# Patient Record
Sex: FEMALE | Race: WHITE | URBAN - METROPOLITAN AREA
[De-identification: names, ages, dates, MRNs, and addresses within clinical notes are randomized per-mention and may not be internally consistent; named-entity substitution may affect disease eponyms.]

---

## 2017-06-12 ENCOUNTER — OUTPATIENT (OUTPATIENT)
Dept: OUTPATIENT SERVICES | Facility: HOSPITAL | Age: 42
LOS: 1 days | Discharge: HOME | End: 2017-06-12

## 2017-06-26 ENCOUNTER — OUTPATIENT (OUTPATIENT)
Dept: OUTPATIENT SERVICES | Facility: HOSPITAL | Age: 42
LOS: 1 days | Discharge: HOME | End: 2017-06-26

## 2017-06-28 DIAGNOSIS — Z01.419 ENCOUNTER FOR GYNECOLOGICAL EXAMINATION (GENERAL) (ROUTINE) WITHOUT ABNORMAL FINDINGS: ICD-10-CM

## 2017-06-28 DIAGNOSIS — Z11.3 ENCOUNTER FOR SCREENING FOR INFECTIONS WITH A PREDOMINANTLY SEXUAL MODE OF TRANSMISSION: ICD-10-CM

## 2017-06-28 DIAGNOSIS — Z12.31 ENCOUNTER FOR SCREENING MAMMOGRAM FOR MALIGNANT NEOPLASM OF BREAST: ICD-10-CM

## 2018-07-13 ENCOUNTER — OUTPATIENT (OUTPATIENT)
Dept: OUTPATIENT SERVICES | Facility: HOSPITAL | Age: 43
LOS: 1 days | Discharge: HOME | End: 2018-07-13

## 2018-07-13 DIAGNOSIS — Z12.31 ENCOUNTER FOR SCREENING MAMMOGRAM FOR MALIGNANT NEOPLASM OF BREAST: ICD-10-CM

## 2018-07-13 PROBLEM — Z00.00 ENCOUNTER FOR PREVENTIVE HEALTH EXAMINATION: Status: ACTIVE | Noted: 2018-07-13

## 2018-11-27 ENCOUNTER — EMERGENCY (EMERGENCY)
Facility: HOSPITAL | Age: 43
LOS: 0 days | Discharge: HOME | End: 2018-11-27
Admitting: PHYSICIAN ASSISTANT

## 2018-11-27 VITALS
SYSTOLIC BLOOD PRESSURE: 128 MMHG | RESPIRATION RATE: 20 BRPM | OXYGEN SATURATION: 99 % | HEART RATE: 68 BPM | TEMPERATURE: 96 F | DIASTOLIC BLOOD PRESSURE: 66 MMHG
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
## 2018-11-27 DIAGNOSIS — S99.919A UNSPECIFIED INJURY OF UNSPECIFIED ANKLE, INITIAL ENCOUNTER: ICD-10-CM
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
## 2018-11-27 DIAGNOSIS — S93.402A SPRAIN OF UNSPECIFIED LIGAMENT OF LEFT ANKLE, INITIAL ENCOUNTER: ICD-10-CM

## 2018-11-27 NOTE — ED ADULT NURSE NOTE - OBJECTIVE STATEMENT
patient fell while running in heels to catch the bus for work, left leg painful swollen and tender to touch.

## 2018-11-27 NOTE — ED PROVIDER NOTE - PHYSICAL EXAMINATION
CONST: Well appearing in NAD  MS: Normal ROM in all extremities. No midline spinal tenderness. Left ankle lateral tenderness and swelling. FROM. NV intact  SKIN: Warm, dry, no acute rashes. Good turgor  NEURO: A&Ox3, No focal deficits. Strength 5/5 with no sensory deficits.

## 2018-11-27 NOTE — ED PROVIDER NOTE - CARE PROVIDER_API CALL
Wil Carson), Orthopaedic Surgery  [address] [place] [id number]  Phone: [phone]  Fax: [phone]

## 2018-11-27 NOTE — ED ADULT TRIAGE NOTE - CHIEF COMPLAINT QUOTE
pt was running for bus and twisted left ankle, denies hitting head or LOC. alert and in no distress. no anticoagulants.

## 2018-11-27 NOTE — ED PROVIDER NOTE - NS ED ROS FT
CONST: No fever, chills or bodyaches  EYES: No pain, redness, drainage or visual changes.  ENT: No ear pain or discharge, nasal discharge or congestion. No sore throat  CARD: No chest pain, palpitations  RESP: No SOB, cough, hemoptysis. No hx of asthma or COPD  SKIN: No rashes  NEURO: No headache, dizziness, paresthesias or LOC

## 2019-05-23 ENCOUNTER — OUTPATIENT (OUTPATIENT)
Dept: OUTPATIENT SERVICES | Facility: HOSPITAL | Age: 44
LOS: 1 days | Discharge: HOME | End: 2019-05-23

## 2019-05-23 DIAGNOSIS — Z00.00 ENCOUNTER FOR GENERAL ADULT MEDICAL EXAMINATION WITHOUT ABNORMAL FINDINGS: ICD-10-CM

## 2019-05-28 ENCOUNTER — OUTPATIENT (OUTPATIENT)
Dept: OUTPATIENT SERVICES | Facility: HOSPITAL | Age: 44
LOS: 1 days | Discharge: HOME | End: 2019-05-28
Payer: COMMERCIAL

## 2019-05-28 VITALS
SYSTOLIC BLOOD PRESSURE: 118 MMHG | HEART RATE: 77 BPM | DIASTOLIC BLOOD PRESSURE: 78 MMHG | RESPIRATION RATE: 20 BRPM | TEMPERATURE: 98 F | OXYGEN SATURATION: 100 % | WEIGHT: 149.69 LBS | HEIGHT: 62 IN

## 2019-05-28 DIAGNOSIS — Z01.818 ENCOUNTER FOR OTHER PREPROCEDURAL EXAMINATION: ICD-10-CM

## 2019-05-28 DIAGNOSIS — N92.0 EXCESSIVE AND FREQUENT MENSTRUATION WITH REGULAR CYCLE: ICD-10-CM

## 2019-05-28 LAB
ALBUMIN SERPL ELPH-MCNC: 4.4 G/DL — SIGNIFICANT CHANGE UP (ref 3.5–5.2)
ALP SERPL-CCNC: 54 U/L — SIGNIFICANT CHANGE UP (ref 30–115)
ALT FLD-CCNC: 12 U/L — SIGNIFICANT CHANGE UP (ref 0–41)
ANION GAP SERPL CALC-SCNC: 17 MMOL/L — HIGH (ref 7–14)
APPEARANCE UR: ABNORMAL
APTT BLD: 31.3 SEC — SIGNIFICANT CHANGE UP (ref 27–39.2)
AST SERPL-CCNC: 12 U/L — SIGNIFICANT CHANGE UP (ref 0–41)
BACTERIA # UR AUTO: ABNORMAL /HPF
BASOPHILS # BLD AUTO: 0.04 K/UL — SIGNIFICANT CHANGE UP (ref 0–0.2)
BASOPHILS NFR BLD AUTO: 0.5 % — SIGNIFICANT CHANGE UP (ref 0–1)
BILIRUB SERPL-MCNC: 0.2 MG/DL — SIGNIFICANT CHANGE UP (ref 0.2–1.2)
BILIRUB UR-MCNC: NEGATIVE — SIGNIFICANT CHANGE UP
BUN SERPL-MCNC: 25 MG/DL — HIGH (ref 10–20)
CALCIUM SERPL-MCNC: 9.4 MG/DL — SIGNIFICANT CHANGE UP (ref 8.5–10.1)
CHLORIDE SERPL-SCNC: 107 MMOL/L — SIGNIFICANT CHANGE UP (ref 98–110)
CO2 SERPL-SCNC: 15 MMOL/L — LOW (ref 17–32)
COD CRY URNS QL: ABNORMAL /HPF
COLOR SPEC: SIGNIFICANT CHANGE UP
CREAT SERPL-MCNC: 1.2 MG/DL — SIGNIFICANT CHANGE UP (ref 0.7–1.5)
DIFF PNL FLD: ABNORMAL
EOSINOPHIL # BLD AUTO: 0.13 K/UL — SIGNIFICANT CHANGE UP (ref 0–0.7)
EOSINOPHIL NFR BLD AUTO: 1.7 % — SIGNIFICANT CHANGE UP (ref 0–8)
EPI CELLS # UR: ABNORMAL /HPF
GLUCOSE SERPL-MCNC: 91 MG/DL — SIGNIFICANT CHANGE UP (ref 70–99)
GLUCOSE UR QL: NEGATIVE — SIGNIFICANT CHANGE UP
HCT VFR BLD CALC: 44.8 % — SIGNIFICANT CHANGE UP (ref 37–47)
HGB BLD-MCNC: 15.2 G/DL — SIGNIFICANT CHANGE UP (ref 12–16)
IMM GRANULOCYTES NFR BLD AUTO: 0.9 % — HIGH (ref 0.1–0.3)
INR BLD: 0.92 RATIO — SIGNIFICANT CHANGE UP (ref 0.65–1.3)
KETONES UR-MCNC: NEGATIVE — SIGNIFICANT CHANGE UP
LEUKOCYTE ESTERASE UR-ACNC: NEGATIVE — SIGNIFICANT CHANGE UP
LYMPHOCYTES # BLD AUTO: 2.06 K/UL — SIGNIFICANT CHANGE UP (ref 1.2–3.4)
LYMPHOCYTES # BLD AUTO: 26.3 % — SIGNIFICANT CHANGE UP (ref 20.5–51.1)
MCHC RBC-ENTMCNC: 30.4 PG — SIGNIFICANT CHANGE UP (ref 27–31)
MCHC RBC-ENTMCNC: 33.9 G/DL — SIGNIFICANT CHANGE UP (ref 32–37)
MCV RBC AUTO: 89.6 FL — SIGNIFICANT CHANGE UP (ref 81–99)
MONOCYTES # BLD AUTO: 0.52 K/UL — SIGNIFICANT CHANGE UP (ref 0.1–0.6)
MONOCYTES NFR BLD AUTO: 6.6 % — SIGNIFICANT CHANGE UP (ref 1.7–9.3)
NEUTROPHILS # BLD AUTO: 5 K/UL — SIGNIFICANT CHANGE UP (ref 1.4–6.5)
NEUTROPHILS NFR BLD AUTO: 64 % — SIGNIFICANT CHANGE UP (ref 42.2–75.2)
NITRITE UR-MCNC: NEGATIVE — SIGNIFICANT CHANGE UP
NRBC # BLD: 0 /100 WBCS — SIGNIFICANT CHANGE UP (ref 0–0)
PH UR: 6 — SIGNIFICANT CHANGE UP (ref 5–8)
PLATELET # BLD AUTO: 273 K/UL — SIGNIFICANT CHANGE UP (ref 130–400)
POTASSIUM SERPL-MCNC: 3.9 MMOL/L — SIGNIFICANT CHANGE UP (ref 3.5–5)
POTASSIUM SERPL-SCNC: 3.9 MMOL/L — SIGNIFICANT CHANGE UP (ref 3.5–5)
PROT SERPL-MCNC: 6.6 G/DL — SIGNIFICANT CHANGE UP (ref 6–8)
PROT UR-MCNC: 30
PROTHROM AB SERPL-ACNC: 10.6 SEC — SIGNIFICANT CHANGE UP (ref 9.95–12.87)
RBC # BLD: 5 M/UL — SIGNIFICANT CHANGE UP (ref 4.2–5.4)
RBC # FLD: 12.8 % — SIGNIFICANT CHANGE UP (ref 11.5–14.5)
SODIUM SERPL-SCNC: 139 MMOL/L — SIGNIFICANT CHANGE UP (ref 135–146)
SP GR SPEC: >=1.03 — SIGNIFICANT CHANGE UP (ref 1.01–1.03)
UROBILINOGEN FLD QL: 0.2 — SIGNIFICANT CHANGE UP (ref 0.2–0.2)
WBC # BLD: 7.82 K/UL — SIGNIFICANT CHANGE UP (ref 4.8–10.8)
WBC # FLD AUTO: 7.82 K/UL — SIGNIFICANT CHANGE UP (ref 4.8–10.8)
WBC UR QL: SIGNIFICANT CHANGE UP /HPF

## 2019-05-28 PROCEDURE — 93010 ELECTROCARDIOGRAM REPORT: CPT

## 2019-05-28 PROCEDURE — 71046 X-RAY EXAM CHEST 2 VIEWS: CPT | Mod: 26

## 2019-05-28 NOTE — H&P PST ADULT - HISTORY OF PRESENT ILLNESS
43 y/o female scheduled for removal of iud and d&c  reports no c/o cp,sob,palpitations,cough or dysuria  1-2 fos without sob

## 2019-05-31 ENCOUNTER — OUTPATIENT (OUTPATIENT)
Dept: OUTPATIENT SERVICES | Facility: HOSPITAL | Age: 44
LOS: 1 days | Discharge: HOME | End: 2019-05-31
Payer: COMMERCIAL

## 2019-05-31 ENCOUNTER — RESULT REVIEW (OUTPATIENT)
Age: 44
End: 2019-05-31

## 2019-05-31 VITALS
DIASTOLIC BLOOD PRESSURE: 57 MMHG | OXYGEN SATURATION: 99 % | RESPIRATION RATE: 18 BRPM | TEMPERATURE: 98 F | HEART RATE: 68 BPM | HEIGHT: 62 IN | SYSTOLIC BLOOD PRESSURE: 120 MMHG | WEIGHT: 149.69 LBS

## 2019-05-31 VITALS
DIASTOLIC BLOOD PRESSURE: 75 MMHG | SYSTOLIC BLOOD PRESSURE: 123 MMHG | RESPIRATION RATE: 18 BRPM | HEART RATE: 71 BPM | OXYGEN SATURATION: 100 %

## 2019-05-31 DIAGNOSIS — Z33.2 ENCOUNTER FOR ELECTIVE TERMINATION OF PREGNANCY: Chronic | ICD-10-CM

## 2019-05-31 PROCEDURE — 88300 SURGICAL PATH GROSS: CPT | Mod: 26,59

## 2019-05-31 PROCEDURE — 88305 TISSUE EXAM BY PATHOLOGIST: CPT | Mod: 26

## 2019-05-31 RX ORDER — SODIUM CHLORIDE 9 MG/ML
1000 INJECTION, SOLUTION INTRAVENOUS
Refills: 0 | Status: DISCONTINUED | OUTPATIENT
Start: 2019-05-31 | End: 2019-06-15

## 2019-05-31 RX ORDER — HYDROMORPHONE HYDROCHLORIDE 2 MG/ML
0.5 INJECTION INTRAMUSCULAR; INTRAVENOUS; SUBCUTANEOUS
Refills: 0 | Status: DISCONTINUED | OUTPATIENT
Start: 2019-05-31 | End: 2019-05-31

## 2019-05-31 RX ORDER — ONDANSETRON 8 MG/1
4 TABLET, FILM COATED ORAL ONCE
Refills: 0 | Status: DISCONTINUED | OUTPATIENT
Start: 2019-05-31 | End: 2019-06-15

## 2019-05-31 RX ORDER — OXYCODONE AND ACETAMINOPHEN 5; 325 MG/1; MG/1
1 TABLET ORAL ONCE
Refills: 0 | Status: DISCONTINUED | OUTPATIENT
Start: 2019-05-31 | End: 2019-05-31

## 2019-05-31 RX ADMIN — SODIUM CHLORIDE 100 MILLILITER(S): 9 INJECTION, SOLUTION INTRAVENOUS at 09:10

## 2019-05-31 NOTE — BRIEF OPERATIVE NOTE - OPERATION/FINDINGS
EUA: 8wk size, anteverted uterus  hysteroscopy: significant amount of endometrial tissue/blood present in cavity, bilateral ostia visualized

## 2019-05-31 NOTE — BRIEF OPERATIVE NOTE - NSICDXBRIEFPREOP_GEN_ALL_CORE_FT
PRE-OP DIAGNOSIS:  Menorrhagia 31-May-2019 09:03:40  Nandini Oseguera  Encounter for IUD removal 31-May-2019 09:03:30  Nandini Oseguera

## 2019-05-31 NOTE — BRIEF OPERATIVE NOTE - NSICDXBRIEFPROCEDURE_GEN_ALL_CORE_FT
PROCEDURES:  Removal of IUD 31-May-2019 09:01:40  Nandini Oseguera  D&C (dilatation and curettage, scraping of uterus) 31-May-2019 09:01:27  Nandini Oseguera  Diagnostic hysteroscopy 31-May-2019 09:01:20  Nandini Oseguera

## 2019-05-31 NOTE — BRIEF OPERATIVE NOTE - NSICDXBRIEFPOSTOP_GEN_ALL_CORE_FT
POST-OP DIAGNOSIS:  Menorrhagia 31-May-2019 09:04:03  Nandini Oseguera  Encounter for IUD removal 31-May-2019 09:03:54  Nandini Oseguera POST-OP DIAGNOSIS:  Menorrhagia 31-May-2019 09:04:03  Nandini Oseguera  Encounter for IUD removal 31-May-2019 09:03:54  Nandnii Oseguera

## 2019-05-31 NOTE — ASU DISCHARGE PLAN (ADULT/PEDIATRIC) - CARE PROVIDER_API CALL
Miguel Arango)  Obstetrics and Gynecology  [address] [place] [address]  Phone: [phone]  Fax: [phone]  Follow Up Time:

## 2019-05-31 NOTE — CHART NOTE - NSCHARTNOTEFT_GEN_A_CORE
PACU ANESTHESIA ADMISSION NOTE      Procedure: Removal of IUD  D&C (dilatation and curettage, scraping of uterus)  Diagnostic hysteroscopy    Post op diagnosis:      ____  Intubated  TV:______       Rate: ______      FiO2: ______    __x__  Patent Airway    __x__  Full return of protective reflexes    __x__  Full recovery from anesthesia / back to baseline status      Vitals:   BP   112/67         HR  84         RR   12          O2 sat   98        Temp 97.7      Mental Status:  __x__ Awake   _____ Alert   _____ Drowsy   _____ Sedated    Nausea/Vomiting:  __x__ No    ____ Yes, See Post - Op Orders        Pain Scale (0-10):  __0___    Treatment: ____ None    ____ See Post - Op/PCA Orders    Post - Operative Fluids:   __x__ Oral   ____ See Post - Op Orders    Plan: Discharge:   _x___Home       _____Floor     _____Critical Care    _____  Other:_________________    Comments: No anesthesia complications noted.  Discharge once criteria met.

## 2019-05-31 NOTE — PRE-ANESTHESIA EVALUATION ADULT - NSANTHOSAYNRD_GEN_A_CORE
No. MIKALA screening performed.  STOP BANG Legend: 0-2 = LOW Risk; 3-4 = INTERMEDIATE Risk; 5-8 = HIGH Risk/see mikala tool

## 2019-06-04 LAB — SURGICAL PATHOLOGY STUDY: SIGNIFICANT CHANGE UP

## 2019-06-06 DIAGNOSIS — Z30.432 ENCOUNTER FOR REMOVAL OF INTRAUTERINE CONTRACEPTIVE DEVICE: ICD-10-CM

## 2019-06-06 DIAGNOSIS — Z87.891 PERSONAL HISTORY OF NICOTINE DEPENDENCE: ICD-10-CM

## 2019-06-06 DIAGNOSIS — N92.0 EXCESSIVE AND FREQUENT MENSTRUATION WITH REGULAR CYCLE: ICD-10-CM

## 2019-08-12 ENCOUNTER — OUTPATIENT (OUTPATIENT)
Dept: OUTPATIENT SERVICES | Facility: HOSPITAL | Age: 44
LOS: 1 days | Discharge: HOME | End: 2019-08-12
Payer: COMMERCIAL

## 2019-08-12 VITALS
SYSTOLIC BLOOD PRESSURE: 110 MMHG | TEMPERATURE: 98 F | WEIGHT: 148.59 LBS | OXYGEN SATURATION: 99 % | DIASTOLIC BLOOD PRESSURE: 72 MMHG | RESPIRATION RATE: 16 BRPM | HEIGHT: 63 IN | HEART RATE: 72 BPM

## 2019-08-12 DIAGNOSIS — Z01.818 ENCOUNTER FOR OTHER PREPROCEDURAL EXAMINATION: ICD-10-CM

## 2019-08-12 DIAGNOSIS — Z98.890 OTHER SPECIFIED POSTPROCEDURAL STATES: Chronic | ICD-10-CM

## 2019-08-12 DIAGNOSIS — Z33.2 ENCOUNTER FOR ELECTIVE TERMINATION OF PREGNANCY: Chronic | ICD-10-CM

## 2019-08-12 DIAGNOSIS — N92.0 EXCESSIVE AND FREQUENT MENSTRUATION WITH REGULAR CYCLE: ICD-10-CM

## 2019-08-12 LAB
ALBUMIN SERPL ELPH-MCNC: 4.5 G/DL — SIGNIFICANT CHANGE UP (ref 3.5–5.2)
ALP SERPL-CCNC: 62 U/L — SIGNIFICANT CHANGE UP (ref 30–115)
ALT FLD-CCNC: 12 U/L — SIGNIFICANT CHANGE UP (ref 0–41)
ANION GAP SERPL CALC-SCNC: 13 MMOL/L — SIGNIFICANT CHANGE UP (ref 7–14)
APPEARANCE UR: CLEAR — SIGNIFICANT CHANGE UP
APTT BLD: 29.3 SEC — SIGNIFICANT CHANGE UP (ref 27–39.2)
AST SERPL-CCNC: 13 U/L — SIGNIFICANT CHANGE UP (ref 0–41)
BACTERIA # UR AUTO: NEGATIVE — SIGNIFICANT CHANGE UP
BASOPHILS # BLD AUTO: 0.07 K/UL — SIGNIFICANT CHANGE UP (ref 0–0.2)
BASOPHILS NFR BLD AUTO: 1 % — SIGNIFICANT CHANGE UP (ref 0–1)
BILIRUB SERPL-MCNC: <0.2 MG/DL — SIGNIFICANT CHANGE UP (ref 0.2–1.2)
BILIRUB UR-MCNC: NEGATIVE — SIGNIFICANT CHANGE UP
BUN SERPL-MCNC: 21 MG/DL — HIGH (ref 10–20)
CALCIUM SERPL-MCNC: 9.6 MG/DL — SIGNIFICANT CHANGE UP (ref 8.5–10.1)
CHLORIDE SERPL-SCNC: 110 MMOL/L — SIGNIFICANT CHANGE UP (ref 98–110)
CO2 SERPL-SCNC: 20 MMOL/L — SIGNIFICANT CHANGE UP (ref 17–32)
COLOR SPEC: YELLOW — SIGNIFICANT CHANGE UP
CREAT SERPL-MCNC: 1 MG/DL — SIGNIFICANT CHANGE UP (ref 0.7–1.5)
DIFF PNL FLD: ABNORMAL
EOSINOPHIL # BLD AUTO: 0.14 K/UL — SIGNIFICANT CHANGE UP (ref 0–0.7)
EOSINOPHIL NFR BLD AUTO: 2 % — SIGNIFICANT CHANGE UP (ref 0–8)
EPI CELLS # UR: 5 /HPF — SIGNIFICANT CHANGE UP (ref 0–5)
GLUCOSE SERPL-MCNC: 110 MG/DL — HIGH (ref 70–99)
GLUCOSE UR QL: NEGATIVE — SIGNIFICANT CHANGE UP
HCG SERPL-ACNC: <0.6 MIU/ML — SIGNIFICANT CHANGE UP
HCT VFR BLD CALC: 36.8 % — LOW (ref 37–47)
HGB BLD-MCNC: 11.6 G/DL — LOW (ref 12–16)
HYALINE CASTS # UR AUTO: 2 /LPF — SIGNIFICANT CHANGE UP (ref 0–7)
IMM GRANULOCYTES NFR BLD AUTO: 1.2 % — HIGH (ref 0.1–0.3)
INR BLD: 0.98 RATIO — SIGNIFICANT CHANGE UP (ref 0.65–1.3)
KETONES UR-MCNC: SIGNIFICANT CHANGE UP
LEUKOCYTE ESTERASE UR-ACNC: NEGATIVE — SIGNIFICANT CHANGE UP
LYMPHOCYTES # BLD AUTO: 2.1 K/UL — SIGNIFICANT CHANGE UP (ref 1.2–3.4)
LYMPHOCYTES # BLD AUTO: 30.6 % — SIGNIFICANT CHANGE UP (ref 20.5–51.1)
MCHC RBC-ENTMCNC: 28 PG — SIGNIFICANT CHANGE UP (ref 27–31)
MCHC RBC-ENTMCNC: 31.5 G/DL — LOW (ref 32–37)
MCV RBC AUTO: 88.9 FL — SIGNIFICANT CHANGE UP (ref 81–99)
MONOCYTES # BLD AUTO: 0.41 K/UL — SIGNIFICANT CHANGE UP (ref 0.1–0.6)
MONOCYTES NFR BLD AUTO: 6 % — SIGNIFICANT CHANGE UP (ref 1.7–9.3)
NEUTROPHILS # BLD AUTO: 4.06 K/UL — SIGNIFICANT CHANGE UP (ref 1.4–6.5)
NEUTROPHILS NFR BLD AUTO: 59.2 % — SIGNIFICANT CHANGE UP (ref 42.2–75.2)
NITRITE UR-MCNC: NEGATIVE — SIGNIFICANT CHANGE UP
NRBC # BLD: 0 /100 WBCS — SIGNIFICANT CHANGE UP (ref 0–0)
PH UR: 6 — SIGNIFICANT CHANGE UP (ref 5–8)
PLATELET # BLD AUTO: 402 K/UL — HIGH (ref 130–400)
POTASSIUM SERPL-MCNC: 4 MMOL/L — SIGNIFICANT CHANGE UP (ref 3.5–5)
POTASSIUM SERPL-SCNC: 4 MMOL/L — SIGNIFICANT CHANGE UP (ref 3.5–5)
PROT SERPL-MCNC: 6.7 G/DL — SIGNIFICANT CHANGE UP (ref 6–8)
PROT UR-MCNC: ABNORMAL
PROTHROM AB SERPL-ACNC: 11.3 SEC — SIGNIFICANT CHANGE UP (ref 9.95–12.87)
RBC # BLD: 4.14 M/UL — LOW (ref 4.2–5.4)
RBC # FLD: 12.5 % — SIGNIFICANT CHANGE UP (ref 11.5–14.5)
RBC CASTS # UR COMP ASSIST: 9 /HPF — HIGH (ref 0–4)
SODIUM SERPL-SCNC: 143 MMOL/L — SIGNIFICANT CHANGE UP (ref 135–146)
SP GR SPEC: 1.03 — HIGH (ref 1.01–1.02)
UROBILINOGEN FLD QL: SIGNIFICANT CHANGE UP
WBC # BLD: 6.86 K/UL — SIGNIFICANT CHANGE UP (ref 4.8–10.8)
WBC # FLD AUTO: 6.86 K/UL — SIGNIFICANT CHANGE UP (ref 4.8–10.8)
WBC UR QL: 2 /HPF — SIGNIFICANT CHANGE UP (ref 0–5)

## 2019-08-12 PROCEDURE — 93010 ELECTROCARDIOGRAM REPORT: CPT

## 2019-08-12 PROCEDURE — 71046 X-RAY EXAM CHEST 2 VIEWS: CPT | Mod: 26

## 2019-08-12 NOTE — H&P PST ADULT - NEUROLOGICAL
HPI     Ms. Karli Hameed was referred by Katherine Linn MD for cataracts.    She reports occasional near blur without glasses, especially when she is   tired. Eyes (OS>OD) feel tired if she reads or watches TV too long. She is   noticing she is needing to use glasses more often, especially when   reading. She reports satisfactory distance vision without correction.    Would patient like a refraction today? yes    (+)drops: Naphcon A qAM OU, Zaditor occasionally (when eyes burn)  (-)flashes  (-)floaters  (-)diplopia    Diabetic: No  No results found for: HGBA1C    OCULAR HISTORY  Last Eye Exam: 02/22/17 with Dr. Martinez  s/p phaco w/IOL (OD 02/12/15, OS 04/09/15)  Ischemic optic neuropathy OS     FAMILY HISTORY  (-)Glaucoma         Last edited by Joie Villegas, OD on 12/19/2018  9:47 AM. (History)            Assessment /Plan     For exam results, see Encounter Report.    Bilateral dry eyes   Explained relationship between dry eyes and epiphora, blurred vision, and burning. Discontinue ocular decongestant and switch to artificial tears TID OU (especially during sustained near tasks).    Regular astigmatism with presbyopia, bilateral   Small change OU. New glasses prescription released, adaptation expected.  New glasses optional.    Note: Error found on SRx after pt left (sphere power written as -1.25 instead of -0.25 OD). Pt informed via [facility] and corrected SRx mailed to her.   Eyeglass Final Rx     Eyeglass Final Rx       Sphere Cylinder Axis Add    Right -0.25 +1.00 180 +2.75    Left -0.50 +1.00 180 +2.75    Expiration Date:  12/20/2019            Optic disc atrophy, left   Longstanding and stable. Cause of reduced best-corrected visual acuity (20/30, stable). Monitor.    Pseudophakia of both eyes   Doing well OU. Monitor.         RTC 1 year                 
detailed exam

## 2019-08-12 NOTE — H&P PST ADULT - REASON FOR ADMISSION
PT PRESENTS TO PAST WITH NO SOB, CP, PALPITATIONS, DYSURIA, UTI OR URI AT PRESENT.   PT ABLE TO WALK UP 2-3 FLIGHTS OF STEPS WITH NO SOB.  AS PER THE PT, THIS IS HIS/HER COMPLETE MEDICAL AND SURGICAL HX, INCLUDING MEDICATIONS PRESCRIBED AND OVER THE COUNTER  PT PRESENTS TO PAST WITH H/O-   INCREASED MENSTRUATION.

## 2019-08-12 NOTE — H&P PST ADULT - RS GEN PE MLT RESP DETAILS PC
airway patent/clear to auscultation bilaterally/breath sounds equal/respirations non-labored/normal/good air movement/no rales/no chest wall tenderness/no intercostal retractions

## 2019-08-16 ENCOUNTER — OUTPATIENT (OUTPATIENT)
Dept: OUTPATIENT SERVICES | Facility: HOSPITAL | Age: 44
LOS: 1 days | Discharge: HOME | End: 2019-08-16

## 2019-08-16 VITALS
OXYGEN SATURATION: 100 % | RESPIRATION RATE: 18 BRPM | HEART RATE: 71 BPM | SYSTOLIC BLOOD PRESSURE: 122 MMHG | TEMPERATURE: 98 F | HEIGHT: 63 IN | DIASTOLIC BLOOD PRESSURE: 76 MMHG | WEIGHT: 148.59 LBS

## 2019-08-16 VITALS
DIASTOLIC BLOOD PRESSURE: 80 MMHG | OXYGEN SATURATION: 100 % | SYSTOLIC BLOOD PRESSURE: 129 MMHG | RESPIRATION RATE: 18 BRPM | HEART RATE: 55 BPM

## 2019-08-16 DIAGNOSIS — Z33.2 ENCOUNTER FOR ELECTIVE TERMINATION OF PREGNANCY: Chronic | ICD-10-CM

## 2019-08-16 DIAGNOSIS — Z98.890 OTHER SPECIFIED POSTPROCEDURAL STATES: Chronic | ICD-10-CM

## 2019-08-16 RX ORDER — SODIUM CHLORIDE 9 MG/ML
1000 INJECTION, SOLUTION INTRAVENOUS
Refills: 0 | Status: DISCONTINUED | OUTPATIENT
Start: 2019-08-16 | End: 2019-08-31

## 2019-08-16 RX ORDER — ONDANSETRON 8 MG/1
4 TABLET, FILM COATED ORAL ONCE
Refills: 0 | Status: DISCONTINUED | OUTPATIENT
Start: 2019-08-16 | End: 2019-08-31

## 2019-08-16 RX ORDER — MORPHINE SULFATE 50 MG/1
2 CAPSULE, EXTENDED RELEASE ORAL
Refills: 0 | Status: DISCONTINUED | OUTPATIENT
Start: 2019-08-16 | End: 2019-08-16

## 2019-08-16 RX ORDER — OXYCODONE HYDROCHLORIDE 5 MG/1
5 TABLET ORAL ONCE
Refills: 0 | Status: DISCONTINUED | OUTPATIENT
Start: 2019-08-16 | End: 2019-08-16

## 2019-08-16 RX ORDER — SODIUM CHLORIDE 9 MG/ML
3 INJECTION INTRAMUSCULAR; INTRAVENOUS; SUBCUTANEOUS EVERY 8 HOURS
Refills: 0 | Status: DISCONTINUED | OUTPATIENT
Start: 2019-08-16 | End: 2019-08-31

## 2019-08-16 RX ADMIN — OXYCODONE HYDROCHLORIDE 5 MILLIGRAM(S): 5 TABLET ORAL at 09:48

## 2019-08-16 RX ADMIN — SODIUM CHLORIDE 100 MILLILITER(S): 9 INJECTION, SOLUTION INTRAVENOUS at 09:39

## 2019-08-16 NOTE — BRIEF OPERATIVE NOTE - OPERATION/FINDINGS
EUA: normal sized, anteverted uterus with no gross masses, normal contour.  Bilateral adnexa normal.  On hysteroscopic examination, normal endometrial tissue, no polyps visualized.

## 2019-08-16 NOTE — ASU DISCHARGE PLAN (ADULT/PEDIATRIC) - ASU DC SPECIAL INSTRUCTIONSFT
Nothing in the vagina for 2 weeks (no sex, no tampons, no douching). Avoid tub baths, you may shower.  If you have a fever of 100.4F or greater, severe vaginal bleeding, or severe abdominal pain, call your Ob/Gyn or come to the emergency department immediately.  Please follow up with your provider in 2 weeks for postop visit.

## 2019-08-16 NOTE — BRIEF OPERATIVE NOTE - NSICDXBRIEFPROCEDURE_GEN_ALL_CORE_FT
PROCEDURES:  Hysteroscopy with hydrothermal ablation of endometrium 16-Aug-2019 09:12:30  Abigail French

## 2019-08-16 NOTE — ASU DISCHARGE PLAN (ADULT/PEDIATRIC) - CARE PROVIDER_API CALL
Miguel Arango)  Obstetrics and Gynecology  [address] [place] [address]  Phone: [phone]  Fax: [phone]  Follow Up Time:

## 2019-08-16 NOTE — CHART NOTE - NSCHARTNOTEFT_GEN_A_CORE
PACU ANESTHESIA ADMISSION NOTE      Procedure: hydrothrothermablation       ____  Intubated  TV:______       Rate: ______      FiO2: ______    _x___  Patent Airway    _x___  Full return of protective reflexes    _x___  Full recovery from anesthesia / back to baseline status    Vitals:  T(C): 36.4  HR: 71  BP: 122/76  RR: 18  SpO2: 100%    Mental Status:  _x___ Awake   _____ Alert   _____ Drowsy   _____ Sedated    Nausea/Vomiting:  _x___  NO       ______Yes,   See Post - Op Orders         Pain Scale (0-10):  __0___    Treatment: _x___ None    ____ See Post - Op/PCA Orders    Post - Operative Fluids:   __x__ Oral   ____ See Post - Op Orders    Plan: Discharge:   _x___Home       _____Floor     _____Critical Care    _____  Other:_________________    Comments:  No anesthesia issues or complications noted.  Discharge when criteria met.

## 2019-08-19 DIAGNOSIS — N92.0 EXCESSIVE AND FREQUENT MENSTRUATION WITH REGULAR CYCLE: ICD-10-CM

## 2019-09-02 ENCOUNTER — TRANSCRIPTION ENCOUNTER (OUTPATIENT)
Age: 44
End: 2019-09-02

## 2019-11-11 ENCOUNTER — OUTPATIENT (OUTPATIENT)
Dept: OUTPATIENT SERVICES | Facility: HOSPITAL | Age: 44
LOS: 1 days | Discharge: HOME | End: 2019-11-11
Payer: COMMERCIAL

## 2019-11-11 DIAGNOSIS — Z98.890 OTHER SPECIFIED POSTPROCEDURAL STATES: Chronic | ICD-10-CM

## 2019-11-11 DIAGNOSIS — Z33.2 ENCOUNTER FOR ELECTIVE TERMINATION OF PREGNANCY: Chronic | ICD-10-CM

## 2019-11-11 DIAGNOSIS — Z12.31 ENCOUNTER FOR SCREENING MAMMOGRAM FOR MALIGNANT NEOPLASM OF BREAST: ICD-10-CM

## 2019-11-11 PROBLEM — D64.9 ANEMIA, UNSPECIFIED: Chronic | Status: ACTIVE | Noted: 2019-08-12

## 2019-11-11 PROBLEM — L40.9 PSORIASIS, UNSPECIFIED: Chronic | Status: ACTIVE | Noted: 2019-08-16

## 2019-11-11 PROCEDURE — 77067 SCR MAMMO BI INCL CAD: CPT | Mod: 26

## 2019-11-11 PROCEDURE — 77063 BREAST TOMOSYNTHESIS BI: CPT | Mod: 26

## 2019-11-22 ENCOUNTER — OUTPATIENT (OUTPATIENT)
Dept: OUTPATIENT SERVICES | Facility: HOSPITAL | Age: 44
LOS: 1 days | Discharge: HOME | End: 2019-11-22
Payer: COMMERCIAL

## 2019-11-22 VITALS
SYSTOLIC BLOOD PRESSURE: 117 MMHG | DIASTOLIC BLOOD PRESSURE: 67 MMHG | TEMPERATURE: 98 F | HEART RATE: 64 BPM | OXYGEN SATURATION: 100 % | RESPIRATION RATE: 20 BRPM | HEIGHT: 63 IN | WEIGHT: 157.19 LBS

## 2019-11-22 DIAGNOSIS — Z33.2 ENCOUNTER FOR ELECTIVE TERMINATION OF PREGNANCY: Chronic | ICD-10-CM

## 2019-11-22 DIAGNOSIS — Z01.818 ENCOUNTER FOR OTHER PREPROCEDURAL EXAMINATION: ICD-10-CM

## 2019-11-22 DIAGNOSIS — N92.0 EXCESSIVE AND FREQUENT MENSTRUATION WITH REGULAR CYCLE: ICD-10-CM

## 2019-11-22 DIAGNOSIS — Z98.890 OTHER SPECIFIED POSTPROCEDURAL STATES: Chronic | ICD-10-CM

## 2019-11-22 LAB
ALBUMIN SERPL ELPH-MCNC: 4.2 G/DL — SIGNIFICANT CHANGE UP (ref 3.5–5.2)
ALP SERPL-CCNC: 56 U/L — SIGNIFICANT CHANGE UP (ref 30–115)
ALT FLD-CCNC: 9 U/L — SIGNIFICANT CHANGE UP (ref 0–41)
ANION GAP SERPL CALC-SCNC: 13 MMOL/L — SIGNIFICANT CHANGE UP (ref 7–14)
APPEARANCE UR: CLEAR — SIGNIFICANT CHANGE UP
APTT BLD: 30 SEC — SIGNIFICANT CHANGE UP (ref 27–39.2)
AST SERPL-CCNC: 11 U/L — SIGNIFICANT CHANGE UP (ref 0–41)
BASOPHILS # BLD AUTO: 0.06 K/UL — SIGNIFICANT CHANGE UP (ref 0–0.2)
BASOPHILS NFR BLD AUTO: 0.8 % — SIGNIFICANT CHANGE UP (ref 0–1)
BILIRUB SERPL-MCNC: <0.2 MG/DL — SIGNIFICANT CHANGE UP (ref 0.2–1.2)
BILIRUB UR-MCNC: NEGATIVE — SIGNIFICANT CHANGE UP
BUN SERPL-MCNC: 22 MG/DL — HIGH (ref 10–20)
CALCIUM SERPL-MCNC: 9 MG/DL — SIGNIFICANT CHANGE UP (ref 8.5–10.1)
CHLORIDE SERPL-SCNC: 106 MMOL/L — SIGNIFICANT CHANGE UP (ref 98–110)
CO2 SERPL-SCNC: 24 MMOL/L — SIGNIFICANT CHANGE UP (ref 17–32)
COLOR SPEC: YELLOW — SIGNIFICANT CHANGE UP
CREAT SERPL-MCNC: 0.9 MG/DL — SIGNIFICANT CHANGE UP (ref 0.7–1.5)
DIFF PNL FLD: NEGATIVE — SIGNIFICANT CHANGE UP
EOSINOPHIL # BLD AUTO: 0.15 K/UL — SIGNIFICANT CHANGE UP (ref 0–0.7)
EOSINOPHIL NFR BLD AUTO: 2 % — SIGNIFICANT CHANGE UP (ref 0–8)
GLUCOSE SERPL-MCNC: 101 MG/DL — HIGH (ref 70–99)
GLUCOSE UR QL: NEGATIVE — SIGNIFICANT CHANGE UP
HCT VFR BLD CALC: 39.4 % — SIGNIFICANT CHANGE UP (ref 37–47)
HGB BLD-MCNC: 12 G/DL — SIGNIFICANT CHANGE UP (ref 12–16)
IMM GRANULOCYTES NFR BLD AUTO: 0.3 % — SIGNIFICANT CHANGE UP (ref 0.1–0.3)
INR BLD: 0.99 RATIO — SIGNIFICANT CHANGE UP (ref 0.65–1.3)
KETONES UR-MCNC: NEGATIVE — SIGNIFICANT CHANGE UP
LEUKOCYTE ESTERASE UR-ACNC: NEGATIVE — SIGNIFICANT CHANGE UP
LYMPHOCYTES # BLD AUTO: 2.36 K/UL — SIGNIFICANT CHANGE UP (ref 1.2–3.4)
LYMPHOCYTES # BLD AUTO: 31.7 % — SIGNIFICANT CHANGE UP (ref 20.5–51.1)
MCHC RBC-ENTMCNC: 23.9 PG — LOW (ref 27–31)
MCHC RBC-ENTMCNC: 30.5 G/DL — LOW (ref 32–37)
MCV RBC AUTO: 78.5 FL — LOW (ref 81–99)
MONOCYTES # BLD AUTO: 0.58 K/UL — SIGNIFICANT CHANGE UP (ref 0.1–0.6)
MONOCYTES NFR BLD AUTO: 7.8 % — SIGNIFICANT CHANGE UP (ref 1.7–9.3)
NEUTROPHILS # BLD AUTO: 4.28 K/UL — SIGNIFICANT CHANGE UP (ref 1.4–6.5)
NEUTROPHILS NFR BLD AUTO: 57.4 % — SIGNIFICANT CHANGE UP (ref 42.2–75.2)
NITRITE UR-MCNC: NEGATIVE — SIGNIFICANT CHANGE UP
NRBC # BLD: 0 /100 WBCS — SIGNIFICANT CHANGE UP (ref 0–0)
PH UR: 7 — SIGNIFICANT CHANGE UP (ref 5–8)
PLATELET # BLD AUTO: 349 K/UL — SIGNIFICANT CHANGE UP (ref 130–400)
POTASSIUM SERPL-MCNC: 4.7 MMOL/L — SIGNIFICANT CHANGE UP (ref 3.5–5)
POTASSIUM SERPL-SCNC: 4.7 MMOL/L — SIGNIFICANT CHANGE UP (ref 3.5–5)
PROT SERPL-MCNC: 6.2 G/DL — SIGNIFICANT CHANGE UP (ref 6–8)
PROT UR-MCNC: SIGNIFICANT CHANGE UP
PROTHROM AB SERPL-ACNC: 11.4 SEC — SIGNIFICANT CHANGE UP (ref 9.95–12.87)
RBC # BLD: 5.02 M/UL — SIGNIFICANT CHANGE UP (ref 4.2–5.4)
RBC # FLD: 19.1 % — HIGH (ref 11.5–14.5)
SODIUM SERPL-SCNC: 143 MMOL/L — SIGNIFICANT CHANGE UP (ref 135–146)
SP GR SPEC: 1.03 — HIGH (ref 1.01–1.02)
UROBILINOGEN FLD QL: SIGNIFICANT CHANGE UP
WBC # BLD: 7.45 K/UL — SIGNIFICANT CHANGE UP (ref 4.8–10.8)
WBC # FLD AUTO: 7.45 K/UL — SIGNIFICANT CHANGE UP (ref 4.8–10.8)

## 2019-11-22 PROCEDURE — 93010 ELECTROCARDIOGRAM REPORT: CPT

## 2019-11-22 PROCEDURE — 71046 X-RAY EXAM CHEST 2 VIEWS: CPT | Mod: 26

## 2019-11-22 NOTE — H&P PST ADULT - HISTORY OF PRESENT ILLNESS
43 y/o female scheduled for tahbso reports h/o increased vaginal bleeding  reports no c/o cp,sob,palpitations,cough or dysuria  1-2 fos without sob

## 2019-11-25 LAB — BLD GP AB SCN SERPL QL: SIGNIFICANT CHANGE UP

## 2019-12-06 ENCOUNTER — RESULT REVIEW (OUTPATIENT)
Age: 44
End: 2019-12-06

## 2019-12-06 ENCOUNTER — INPATIENT (INPATIENT)
Facility: HOSPITAL | Age: 44
LOS: 2 days | Discharge: HOME | End: 2019-12-09
Attending: OBSTETRICS & GYNECOLOGY | Admitting: OBSTETRICS & GYNECOLOGY
Payer: COMMERCIAL

## 2019-12-06 VITALS
DIASTOLIC BLOOD PRESSURE: 80 MMHG | HEART RATE: 71 BPM | RESPIRATION RATE: 18 BRPM | SYSTOLIC BLOOD PRESSURE: 116 MMHG | TEMPERATURE: 98 F

## 2019-12-06 DIAGNOSIS — Z33.2 ENCOUNTER FOR ELECTIVE TERMINATION OF PREGNANCY: Chronic | ICD-10-CM

## 2019-12-06 DIAGNOSIS — Z98.890 OTHER SPECIFIED POSTPROCEDURAL STATES: Chronic | ICD-10-CM

## 2019-12-06 LAB
GLUCOSE BLDC GLUCOMTR-MCNC: 116 MG/DL — HIGH (ref 70–99)
GLUCOSE BLDC GLUCOMTR-MCNC: 90 MG/DL — SIGNIFICANT CHANGE UP (ref 70–99)

## 2019-12-06 PROCEDURE — 88302 TISSUE EXAM BY PATHOLOGIST: CPT | Mod: 26

## 2019-12-06 PROCEDURE — 88307 TISSUE EXAM BY PATHOLOGIST: CPT | Mod: 26

## 2019-12-06 RX ORDER — OXYCODONE AND ACETAMINOPHEN 5; 325 MG/1; MG/1
1 TABLET ORAL ONCE
Refills: 0 | Status: DISCONTINUED | OUTPATIENT
Start: 2019-12-06 | End: 2019-12-07

## 2019-12-06 RX ORDER — FAMOTIDINE 10 MG/ML
20 INJECTION INTRAVENOUS
Refills: 0 | Status: DISCONTINUED | OUTPATIENT
Start: 2019-12-06 | End: 2019-12-09

## 2019-12-06 RX ORDER — SODIUM CHLORIDE 9 MG/ML
1000 INJECTION, SOLUTION INTRAVENOUS
Refills: 0 | Status: DISCONTINUED | OUTPATIENT
Start: 2019-12-06 | End: 2019-12-07

## 2019-12-06 RX ORDER — NALOXONE HYDROCHLORIDE 4 MG/.1ML
0.1 SPRAY NASAL
Refills: 0 | Status: DISCONTINUED | OUTPATIENT
Start: 2019-12-06 | End: 2019-12-09

## 2019-12-06 RX ORDER — ONDANSETRON 8 MG/1
4 TABLET, FILM COATED ORAL EVERY 6 HOURS
Refills: 0 | Status: DISCONTINUED | OUTPATIENT
Start: 2019-12-06 | End: 2019-12-09

## 2019-12-06 RX ORDER — ONDANSETRON 8 MG/1
8 TABLET, FILM COATED ORAL EVERY 6 HOURS
Refills: 0 | Status: DISCONTINUED | OUTPATIENT
Start: 2019-12-06 | End: 2019-12-09

## 2019-12-06 RX ORDER — ACETAMINOPHEN 500 MG
650 TABLET ORAL EVERY 6 HOURS
Refills: 0 | Status: DISCONTINUED | OUTPATIENT
Start: 2019-12-07 | End: 2019-12-09

## 2019-12-06 RX ORDER — CEFAZOLIN SODIUM 1 G
2000 VIAL (EA) INJECTION EVERY 8 HOURS
Refills: 0 | Status: COMPLETED | OUTPATIENT
Start: 2019-12-07 | End: 2019-12-07

## 2019-12-06 RX ORDER — ACETAMINOPHEN 500 MG
1000 TABLET ORAL ONCE
Refills: 0 | Status: COMPLETED | OUTPATIENT
Start: 2019-12-06 | End: 2019-12-06

## 2019-12-06 RX ORDER — ONDANSETRON 8 MG/1
4 TABLET, FILM COATED ORAL ONCE
Refills: 0 | Status: COMPLETED | OUTPATIENT
Start: 2019-12-06 | End: 2019-12-06

## 2019-12-06 RX ORDER — HYDROMORPHONE HYDROCHLORIDE 2 MG/ML
30 INJECTION INTRAMUSCULAR; INTRAVENOUS; SUBCUTANEOUS
Refills: 0 | Status: DISCONTINUED | OUTPATIENT
Start: 2019-12-06 | End: 2019-12-07

## 2019-12-06 RX ORDER — METOCLOPRAMIDE HCL 10 MG
10 TABLET ORAL
Refills: 0 | Status: DISCONTINUED | OUTPATIENT
Start: 2019-12-06 | End: 2019-12-09

## 2019-12-06 RX ORDER — MORPHINE SULFATE 50 MG/1
4 CAPSULE, EXTENDED RELEASE ORAL
Refills: 0 | Status: DISCONTINUED | OUTPATIENT
Start: 2019-12-06 | End: 2019-12-07

## 2019-12-06 RX ORDER — IBUPROFEN 200 MG
600 TABLET ORAL EVERY 6 HOURS
Refills: 0 | Status: DISCONTINUED | OUTPATIENT
Start: 2019-12-07 | End: 2019-12-09

## 2019-12-06 RX ORDER — OXYCODONE HYDROCHLORIDE 5 MG/1
5 TABLET ORAL EVERY 4 HOURS
Refills: 0 | Status: DISCONTINUED | OUTPATIENT
Start: 2019-12-06 | End: 2019-12-09

## 2019-12-06 RX ADMIN — MORPHINE SULFATE 4 MILLIGRAM(S): 50 CAPSULE, EXTENDED RELEASE ORAL at 21:10

## 2019-12-06 RX ADMIN — MORPHINE SULFATE 4 MILLIGRAM(S): 50 CAPSULE, EXTENDED RELEASE ORAL at 21:25

## 2019-12-06 RX ADMIN — SODIUM CHLORIDE 125 MILLILITER(S): 9 INJECTION, SOLUTION INTRAVENOUS at 22:45

## 2019-12-06 RX ADMIN — SODIUM CHLORIDE 100 MILLILITER(S): 9 INJECTION, SOLUTION INTRAVENOUS at 20:23

## 2019-12-06 RX ADMIN — MORPHINE SULFATE 4 MILLIGRAM(S): 50 CAPSULE, EXTENDED RELEASE ORAL at 21:11

## 2019-12-06 RX ADMIN — ONDANSETRON 4 MILLIGRAM(S): 8 TABLET, FILM COATED ORAL at 21:11

## 2019-12-06 RX ADMIN — MORPHINE SULFATE 4 MILLIGRAM(S): 50 CAPSULE, EXTENDED RELEASE ORAL at 20:57

## 2019-12-06 NOTE — PROGRESS NOTE ADULT - SUBJECTIVE AND OBJECTIVE BOX
PGY 2 Progress Note    Subjective: Pt seen and examined at bedside. Doing well, pain controlled. Pt has davies in place, no flatus or BM, tolerating clear liquids, not yet OOB. Denies CP, SOB, N/V, LE pain.    Physical exam:    Vital Signs Last 24 Hrs  T(F): 98 (06 Dec 2019 20:23), Max: 98.3 (06 Dec 2019 14:22)  HR: 75 (06 Dec 2019 22:45) (58 - 86)  BP: 113/68 (06 Dec 2019 22:45) (106/64 - 131/77)  RR: 17 (06 Dec 2019 22:45) (12 - 20)  SpO2: 99% (06 Dec 2019 22:45) (96% - 99%)    UO (5301-9068) 45cc/hr (adequate 35cc/hr)    Gen: NAD  CVS: s1s2, rrr  Lungs: ctab, no r/r/w  Abdomen: Soft, appropriately tender, no distension , +BS  Incision: tegaderm in place, c/d/i  : davies draining clear  Pelvic: Scant blood on pad  Ext: No calf tenderness, b/l SCDs in place    Diet: clear liquid  meds:   lactated ringers.   100 mL/Hr IV Continuous (12-06-19 @ 20:20)    lactated ringers.   125 mL/Hr IV Continuous (12-06-19 @ 20:35)    morphine  - Injectable   4 milliGRAM(s) IV Push (12-06-19 @ 21:11)   4 milliGRAM(s) IV Push (12-06-19 @ 20:57)    ondansetron Injectable   4 milliGRAM(s) IV Push (12-06-19 @ 21:11)        LABS:

## 2019-12-06 NOTE — BRIEF OPERATIVE NOTE - NSICDXBRIEFPREOP_GEN_ALL_CORE_FT
PRE-OP DIAGNOSIS:  Fibroid uterus 06-Dec-2019 20:32:38  Nancy Murillo  Menorrhagia 06-Dec-2019 20:32:12  Nancy Murillo

## 2019-12-06 NOTE — PROGRESS NOTE ADULT - ASSESSMENT
43 yo P0, s/p THOMAS/BS for menorrhagia/fibroid uterus, POD0, recovering well.    - davies until AM, monitor UO overnight  - pain management PRN, will d/c PCA in AM  - clear liquid diet, IV hydration  - monitor vitals q4hrs  - encourage ambulation, incentive spirometry  - SCDs, lovenox in AM if labs stable  - c/w ancef x2  - AM labs ordered    Dr. Freire and Dr. Arango aware.

## 2019-12-06 NOTE — BRIEF OPERATIVE NOTE - NSICDXBRIEFPOSTOP_GEN_ALL_CORE_FT
POST-OP DIAGNOSIS:  Fibroid uterus 06-Dec-2019 20:32:46  Nancy Murillo  Menorrhagia 06-Dec-2019 20:32:22  Nancy Murillo

## 2019-12-06 NOTE — CHART NOTE - NSCHARTNOTEFT_GEN_A_CORE
PACU ANESTHESIA ADMISSION NOTE      Procedure: total abdominal hysterectomy with bilateral salpingectomy   Post op diagnosis:  menorrhagia     ____  Intubated  TV:______       Rate: ______      FiO2: ______    __x__  Patent Airway    _x___  Full return of protective reflexes    ____  Full recovery from anesthesia / back to baseline     Vitals:   T:   98F        R: 16                 BP:  131/78                Sat: 97                  P: 67      Mental Status:  __x__ Awake   _____ Alert   _____ Drowsy   _____ Sedated    Nausea/Vomiting:  _x___ NO  ______Yes,   See Post - Op Orders          Pain Scale (0-10):  __0___    Treatment: ____ None    ____ See Post - Op/PCA Orders    Post - Operative Fluids:   ____ Oral   _x___ See Post - Op Orders    Plan: Discharge:   ____Home       ___x__Floor     _____Critical Care    _____  Other:_________________    Comments: Pt awake, VS stable, no anesthesia complications.

## 2019-12-06 NOTE — BRIEF OPERATIVE NOTE - NSICDXBRIEFPROCEDURE_GEN_ALL_CORE_FT
PROCEDURES:  Total abdominal hysterectomy with salpingectomy 06-Dec-2019 20:31:47 via pfannenstiel skin incision Nancy Murillo

## 2019-12-07 LAB
ANION GAP SERPL CALC-SCNC: 12 MMOL/L — SIGNIFICANT CHANGE UP (ref 7–14)
BASOPHILS # BLD AUTO: 0.03 K/UL — SIGNIFICANT CHANGE UP (ref 0–0.2)
BASOPHILS NFR BLD AUTO: 0.2 % — SIGNIFICANT CHANGE UP (ref 0–1)
BUN SERPL-MCNC: 14 MG/DL — SIGNIFICANT CHANGE UP (ref 10–20)
CALCIUM SERPL-MCNC: 7.7 MG/DL — LOW (ref 8.5–10.1)
CHLORIDE SERPL-SCNC: 105 MMOL/L — SIGNIFICANT CHANGE UP (ref 98–110)
CO2 SERPL-SCNC: 21 MMOL/L — SIGNIFICANT CHANGE UP (ref 17–32)
CREAT SERPL-MCNC: 0.8 MG/DL — SIGNIFICANT CHANGE UP (ref 0.7–1.5)
EOSINOPHIL # BLD AUTO: 0 K/UL — SIGNIFICANT CHANGE UP (ref 0–0.7)
EOSINOPHIL NFR BLD AUTO: 0 % — SIGNIFICANT CHANGE UP (ref 0–8)
GLUCOSE SERPL-MCNC: 126 MG/DL — HIGH (ref 70–99)
HCT VFR BLD CALC: 30.1 % — LOW (ref 37–47)
HGB BLD-MCNC: 9.3 G/DL — LOW (ref 12–16)
IMM GRANULOCYTES NFR BLD AUTO: 0.6 % — HIGH (ref 0.1–0.3)
LYMPHOCYTES # BLD AUTO: 1.1 K/UL — LOW (ref 1.2–3.4)
LYMPHOCYTES # BLD AUTO: 8.1 % — LOW (ref 20.5–51.1)
MAGNESIUM SERPL-MCNC: 1.6 MG/DL — LOW (ref 1.8–2.4)
MCHC RBC-ENTMCNC: 24.2 PG — LOW (ref 27–31)
MCHC RBC-ENTMCNC: 30.9 G/DL — LOW (ref 32–37)
MCV RBC AUTO: 78.2 FL — LOW (ref 81–99)
MONOCYTES # BLD AUTO: 1.15 K/UL — HIGH (ref 0.1–0.6)
MONOCYTES NFR BLD AUTO: 8.4 % — SIGNIFICANT CHANGE UP (ref 1.7–9.3)
NEUTROPHILS # BLD AUTO: 11.27 K/UL — HIGH (ref 1.4–6.5)
NEUTROPHILS NFR BLD AUTO: 82.7 % — HIGH (ref 42.2–75.2)
NRBC # BLD: 0 /100 WBCS — SIGNIFICANT CHANGE UP (ref 0–0)
PHOSPHATE SERPL-MCNC: 3.9 MG/DL — SIGNIFICANT CHANGE UP (ref 2.1–4.9)
PLATELET # BLD AUTO: 271 K/UL — SIGNIFICANT CHANGE UP (ref 130–400)
POTASSIUM SERPL-MCNC: 4.4 MMOL/L — SIGNIFICANT CHANGE UP (ref 3.5–5)
POTASSIUM SERPL-SCNC: 4.4 MMOL/L — SIGNIFICANT CHANGE UP (ref 3.5–5)
RBC # BLD: 3.85 M/UL — LOW (ref 4.2–5.4)
RBC # FLD: 19.2 % — HIGH (ref 11.5–14.5)
SODIUM SERPL-SCNC: 138 MMOL/L — SIGNIFICANT CHANGE UP (ref 135–146)
WBC # BLD: 13.63 K/UL — HIGH (ref 4.8–10.8)
WBC # FLD AUTO: 13.63 K/UL — HIGH (ref 4.8–10.8)

## 2019-12-07 RX ORDER — SODIUM CHLORIDE 9 MG/ML
1000 INJECTION, SOLUTION INTRAVENOUS
Refills: 0 | Status: DISCONTINUED | OUTPATIENT
Start: 2019-12-07 | End: 2019-12-07

## 2019-12-07 RX ORDER — MAGNESIUM SULFATE 500 MG/ML
2 VIAL (ML) INJECTION ONCE
Refills: 0 | Status: COMPLETED | OUTPATIENT
Start: 2019-12-07 | End: 2019-12-07

## 2019-12-07 RX ORDER — SODIUM CHLORIDE 9 MG/ML
500 INJECTION, SOLUTION INTRAVENOUS ONCE
Refills: 0 | Status: COMPLETED | OUTPATIENT
Start: 2019-12-07 | End: 2019-12-07

## 2019-12-07 RX ORDER — ENOXAPARIN SODIUM 100 MG/ML
40 INJECTION SUBCUTANEOUS EVERY 24 HOURS
Refills: 0 | Status: DISCONTINUED | OUTPATIENT
Start: 2019-12-07 | End: 2019-12-09

## 2019-12-07 RX ORDER — GABAPENTIN 400 MG/1
300 CAPSULE ORAL THREE TIMES A DAY
Refills: 0 | Status: DISCONTINUED | OUTPATIENT
Start: 2019-12-07 | End: 2019-12-09

## 2019-12-07 RX ADMIN — ENOXAPARIN SODIUM 40 MILLIGRAM(S): 100 INJECTION SUBCUTANEOUS at 09:40

## 2019-12-07 RX ADMIN — Medication 100 MILLIGRAM(S): at 09:40

## 2019-12-07 RX ADMIN — Medication 650 MILLIGRAM(S): at 11:02

## 2019-12-07 RX ADMIN — OXYCODONE HYDROCHLORIDE 5 MILLIGRAM(S): 5 TABLET ORAL at 22:29

## 2019-12-07 RX ADMIN — Medication 100 MILLIGRAM(S): at 01:37

## 2019-12-07 RX ADMIN — GABAPENTIN 300 MILLIGRAM(S): 400 CAPSULE ORAL at 13:36

## 2019-12-07 RX ADMIN — Medication 600 MILLIGRAM(S): at 02:15

## 2019-12-07 RX ADMIN — Medication 600 MILLIGRAM(S): at 08:02

## 2019-12-07 RX ADMIN — Medication 650 MILLIGRAM(S): at 05:40

## 2019-12-07 RX ADMIN — Medication 50 GRAM(S): at 09:40

## 2019-12-07 RX ADMIN — FAMOTIDINE 20 MILLIGRAM(S): 10 INJECTION INTRAVENOUS at 17:06

## 2019-12-07 RX ADMIN — Medication 600 MILLIGRAM(S): at 20:08

## 2019-12-07 RX ADMIN — GABAPENTIN 300 MILLIGRAM(S): 400 CAPSULE ORAL at 21:05

## 2019-12-07 RX ADMIN — SODIUM CHLORIDE 150 MILLILITER(S): 9 INJECTION, SOLUTION INTRAVENOUS at 04:25

## 2019-12-07 RX ADMIN — Medication 650 MILLIGRAM(S): at 05:11

## 2019-12-07 RX ADMIN — FAMOTIDINE 20 MILLIGRAM(S): 10 INJECTION INTRAVENOUS at 05:10

## 2019-12-07 RX ADMIN — Medication 10 MILLIGRAM(S): at 08:03

## 2019-12-07 RX ADMIN — Medication 650 MILLIGRAM(S): at 23:56

## 2019-12-07 RX ADMIN — Medication 600 MILLIGRAM(S): at 13:37

## 2019-12-07 RX ADMIN — Medication 650 MILLIGRAM(S): at 17:07

## 2019-12-07 RX ADMIN — Medication 650 MILLIGRAM(S): at 17:06

## 2019-12-07 RX ADMIN — Medication 600 MILLIGRAM(S): at 01:37

## 2019-12-07 RX ADMIN — SODIUM CHLORIDE 1000 MILLILITER(S): 9 INJECTION, SOLUTION INTRAVENOUS at 04:26

## 2019-12-07 NOTE — PROGRESS NOTE ADULT - ATTENDING COMMENTS
as above, pt seen at bedside, agree with findings, impression and plan    IMP: s/p THOMAS B/L sapingectomy - POD # 1 - stable    Plan: dc davies, OOB/Ambulation, full liquid diet, f/u labs

## 2019-12-07 NOTE — PROGRESS NOTE ADULT - SUBJECTIVE AND OBJECTIVE BOX
PGY3 progress note    Patient seen and examined at bedside, resting comfortably. Pain well controlled with PCA pump. Did not ambulate yet. Tolerating clear liquid diet. Mcdonnell in place with clear urine. No flatus yet. Denies headache, n/v, chest pain, SOB, abdominal pain, leg pain, vaginal bleeding.     PE:  Vital Signs Last 24 Hrs  T(C): 36.2 (07 Dec 2019 05:06), Max: 36.8 (06 Dec 2019 14:22)  T(F): 97.1 (07 Dec 2019 05:06), Max: 98.3 (06 Dec 2019 14:22)  HR: 77 (07 Dec 2019 05:06) (58 - 86)  BP: 109/66 (07 Dec 2019 05:06) (106/64 - 131/77)  BP(mean): --  RR: 18 (07 Dec 2019 05:06) (12 - 20)  SpO2: 97% (06 Dec 2019 23:15) (96% - 99%)    UO: 60cc 3963-1623 adequate    GEN: NAD, AAOX3  CVS: RRR, normal S1/S2  lungs: CTAB  abd: soft, nontender, no r/g/r, +BS  incision: c/d/i, dressing changed  ext: no calf tenderness or edema  SVE: deferred, no blood on chux    labs:  11/22 7.45>12/39.4<349, coags 30/11.4/0.99, 143/4.7/106/24/22/0.9<101, AST/ALT 11/9    MEDICATIONS  (STANDING):  acetaminophen   Tablet .. 650 milliGRAM(s) Oral every 6 hours  ceFAZolin   IVPB 2000 milliGRAM(s) IV Intermittent every 8 hours  famotidine    Tablet 20 milliGRAM(s) Oral two times a day  ibuprofen  Tablet. 600 milliGRAM(s) Oral every 6 hours  lactated ringers. 1000 milliLiter(s) (150 mL/Hr) IV Continuous <Continuous>  lactated ringers. 1000 milliLiter(s) (125 mL/Hr) IV Continuous <Continuous>    MEDICATIONS  (PRN):  metoclopramide Injectable 10 milliGRAM(s) IV Push two times a day PRN nausea and vomiting  naloxone Injectable 0.1 milliGRAM(s) IV Push every 3 minutes PRN For ANY of the following changes in patient status:  A. RR LESS THAN 10 breaths per minute, B. Oxygen saturation LESS THAN 90%, C. Sedation score of 6  ondansetron Injectable 4 milliGRAM(s) IV Push every 6 hours PRN Nausea  ondansetron Injectable 8 milliGRAM(s) IV Push every 6 hours PRN Nausea and/or Vomiting  oxyCODONE    IR 5 milliGRAM(s) Oral every 4 hours PRN Severe Pain (7 - 10)

## 2019-12-07 NOTE — PROGRESS NOTE ADULT - ASSESSMENT
43 y/o P0, s/p THOMAS/BS for menorrhagia and fibroid uterus, EBL 600cc, POD1, recovering well.  - davies dc/ed, TOV by 1330  - full liquid diet  - ERAS for pain management, oxycodone for breakthrough pain  - vitals q4h  - f/u AM labs  - if CBC stable will start lovenox  - encourage hydration and ambulation  - incentive spirometer encouraged.    Dr. Arango aware.

## 2019-12-07 NOTE — PATIENT PROFILE ADULT - JOB HELP
CHIEF COMPLAINT:    Interval Events: Pt. appeared more confused this AM. As per the nurse, the pt. was agitated and confused last night as well. + Right ear tinnitius. Reports improved right itchy eye and appetite. No episodes of nausea/vomiting or diarrhea overnight. No headaches, blurry vision, photophobia, nausea/vomiting, fevers/chills, cough, CP/SOB, abdominal pain, dysuria.    REVIEW OF SYSTEMS:  Constitutional: No fever/chills. No recent weight loss or weight gain.   HEENT: Reports mildly itchy right eye. No postnasal drip or nasal congestion.  CV: No chest pain, or palpitations. No orthopnea.   Resp: No cough, or sputum production. No shortness of breath or dyspnea on exertion.   GI: No nausea or vomiting. No diarrhea or constipation.  : No dysuria, no nocturia or increased urinary frequency.  Musculoskeletal: No back pain, no myalgias  Skin: Rash on right forehead and scalp  Neurological: No headache or dizziness. No syncope, no weakness, numbness.  Psychiatric: Denies depressed mood.   Endocrine: No cold or heat intolerance. No dry skin.  Hematologic/Lymphatic: No anemia or bleeding problem.      OBJECTIVE:  Vital Signs Last 24 Hrs  T(C): 36.7 (10 Nov 2018 05:17), Max: 37.1 (2018 13:52)  T(F): 98 (10 Nov 2018 05:17), Max: 98.7 (2018 13:52)  HR: 70 (10 Nov 2018 05:17) (70 - 93)  BP: 128/73 (10 Nov 2018 05:17) (116/55 - 128/73)  BP(mean): --  RR: 18 (10 Nov 2018 05:17) (18 - 18)  SpO2: 95% (10 Nov 2018 05:17) (95% - 96%)     @ 07:01  -  11-10 @ 07:00  --------------------------------------------------------  IN: 1797 mL / OUT: 830 mL / NET: 967 mL      CAPILLARY BLOOD GLUCOSE      POCT Blood Glucose.: 98 mg/dL (10 Nov 2018 08:26)      PHYSICAL EXAM:  Gen: No acute distress, alert, cooperative.  Head: Red, maculopapular rash with groupings of papules on right forehead and right scalp. Doesn't cross the midline. Vesicles starting to crust.  HEENT: PERRL, oral mucosa moist, normal conjunctiva.  Lung: CTAB, no respiratory distress, no crackles or wheezes.  CV: Normal S1/S1, No m/r/g.  Abd: Soft, NT/ND, no rebound or guarding, scar on abdomen, no sign of infection.  MSK: No LE edema.  Neuro: AAO x 1-2. No focal neurologic deficits. Cn 2-12 intact.  Skin: Warm and dry.  Psych: Normal affect, follows commands.    LINES:    HOSPITAL MEDICATIONS:  heparin  Injectable 5000 Unit(s) SubCutaneous every 8 hours      losartan 25 milliGRAM(s) Oral daily  metoprolol tartrate 25 milliGRAM(s) Oral two times a day  tamsulosin 0.4 milliGRAM(s) Oral at bedtime    finasteride 5 milliGRAM(s) Oral daily      acetaminophen   Tablet .. 650 milliGRAM(s) Oral every 6 hours PRN  ALPRAZolam 0.5 milliGRAM(s) Oral every 8 hours PRN  gabapentin 300 milliGRAM(s) Oral three times a day  HYDROmorphone   Tablet 2 milliGRAM(s) Oral every 4 hours PRN  methadone    Tablet 5 milliGRAM(s) Oral every 8 hours  metoclopramide Injectable 10 milliGRAM(s) IV Push every 6 hours PRN  sertraline 50 milliGRAM(s) Oral daily    bismuth subsalicylate Liquid 30 milliLiter(s) Oral four times a day        sodium chloride 0.9%. 1000 milliLiter(s) IV Continuous <Continuous>      artificial tears (preservative free) Ophthalmic Solution 1 Drop(s) Right EYE every 6 hours        LABS:                        11.8   3.94  )-----------( 144      ( 10 Nov 2018 06:13 )             34.4     Hgb Trend: 11.8<--, 10.7<--, 11.5<--, 12.5<--, 12.7<--  11-10    133<L>  |  102  |  27<H>  ----------------------------<  84  4.9   |  14<L>  |  5.56<H>    Ca    8.9      10 Nov 2018 06:13  Phos  5.2     11-10  Mg     1.9     11-10    TPro  6.1  /  Alb  3.3  /  TBili  0.3  /  DBili  x   /  AST  28  /  ALT  21  /  AlkPhos  90  11-10    Creatinine Trend: 5.56<--, 4.85<--, 3.83<--, 3.22<--, 0.99<--, 0.80<--    Urinalysis Basic - ( 2018 10:58 )    Color: YELLOW / Appearance: CLEAR / S.012 / pH: 6.0  Gluc: NEGATIVE / Ketone: TRACE  / Bili: NEGATIVE / Urobili: NORMAL   Blood: NEGATIVE / Protein: 200 / Nitrite: NEGATIVE   Leuk Esterase: NEGATIVE / RBC: 3-5 / WBC 3-5   Sq Epi: FEW / Non Sq Epi: x / Bacteria: NEGATIVE            MICROBIOLOGY:     RADIOLOGY:  [ ] Reviewed and interpreted by me    EKG: CHIEF COMPLAINT:    Interval Events: Pt. appeared more confused this AM. As per the nurse, the pt. was agitated and confused last night as well. + Right ear tinnitius. Reports improved right itchy eye and appetite. No episodes of nausea/vomiting or diarrhea overnight. No headaches, blurry vision, photophobia, nausea/vomiting, fevers/chills, cough, CP/SOB, abdominal pain, dysuria.    REVIEW OF SYSTEMS:  Constitutional: No fever/chills. No recent weight loss or weight gain.   HEENT: Reports mildly itchy right eye. No postnasal drip or nasal congestion.  CV: No chest pain, or palpitations. No orthopnea.   Resp: No cough, or sputum production. No shortness of breath or dyspnea on exertion.   GI: No nausea or vomiting. No diarrhea or constipation.  : No dysuria, no nocturia or increased urinary frequency.  Musculoskeletal: No back pain, no myalgias  Skin: Rash on right forehead and scalp  Neurological: No headache or dizziness. No syncope, no weakness, numbness.  Psychiatric: Denies depressed mood.   Endocrine: No cold or heat intolerance. No dry skin.  Hematologic/Lymphatic: No anemia or bleeding problem.      OBJECTIVE:  Vital Signs Last 24 Hrs  T(C): 36.7 (10 Nov 2018 05:17), Max: 37.1 (2018 13:52)  T(F): 98 (10 Nov 2018 05:17), Max: 98.7 (2018 13:52)  HR: 70 (10 Nov 2018 05:17) (70 - 93)  BP: 128/73 (10 Nov 2018 05:17) (116/55 - 128/73)  BP(mean): --  RR: 18 (10 Nov 2018 05:17) (18 - 18)  SpO2: 95% (10 Nov 2018 05:17) (95% - 96%)     @ 07:01  -  11-10 @ 07:00  --------------------------------------------------------  IN: 1797 mL / OUT: 830 mL / NET: 967 mL      CAPILLARY BLOOD GLUCOSE      POCT Blood Glucose.: 98 mg/dL (10 Nov 2018 08:26)      PHYSICAL EXAM:  Gen: No acute distress, alert, cooperative.  Head: Red, maculopapular rash with groupings of papules on right forehead and right scalp. Doesn't cross the midline. Vesicles starting to crust.  HEENT:oral mucosa moist, normal conjunctiva.  Lung: CTAB, no respiratory distress, no crackles or wheezes.  CV: Normal S1/S1, No m/r/g.  Abd: Soft, NT/ND, no rebound or guarding, scar on abdomen, no sign of infection.  MSK: No LE edema.  Neuro: AAO x 1-2. No focal neurologic deficits. Cn 2-12 intact.  Skin: Warm and dry.  Psych: Normal affect, follows commands.    LINES:    HOSPITAL MEDICATIONS:  heparin  Injectable 5000 Unit(s) SubCutaneous every 8 hours      losartan 25 milliGRAM(s) Oral daily  metoprolol tartrate 25 milliGRAM(s) Oral two times a day  tamsulosin 0.4 milliGRAM(s) Oral at bedtime    finasteride 5 milliGRAM(s) Oral daily      acetaminophen   Tablet .. 650 milliGRAM(s) Oral every 6 hours PRN  ALPRAZolam 0.5 milliGRAM(s) Oral every 8 hours PRN  gabapentin 300 milliGRAM(s) Oral three times a day  HYDROmorphone   Tablet 2 milliGRAM(s) Oral every 4 hours PRN  methadone    Tablet 5 milliGRAM(s) Oral every 8 hours  metoclopramide Injectable 10 milliGRAM(s) IV Push every 6 hours PRN  sertraline 50 milliGRAM(s) Oral daily    bismuth subsalicylate Liquid 30 milliLiter(s) Oral four times a day        sodium chloride 0.9%. 1000 milliLiter(s) IV Continuous <Continuous>      artificial tears (preservative free) Ophthalmic Solution 1 Drop(s) Right EYE every 6 hours        LABS:                        11.8   3.94  )-----------( 144      ( 10 Nov 2018 06:13 )             34.4     Hgb Trend: 11.8<--, 10.7<--, 11.5<--, 12.5<--, 12.7<--  11-10    133<L>  |  102  |  27<H>  ----------------------------<  84  4.9   |  14<L>  |  5.56<H>    Ca    8.9      10 Nov 2018 06:13  Phos  5.2     11-10  Mg     1.9     -10    TPro  6.1  /  Alb  3.3  /  TBili  0.3  /  DBili  x   /  AST  28  /  ALT  21  /  AlkPhos  90  11-10    Creatinine Trend: 5.56<--, 4.85<--, 3.83<--, 3.22<--, 0.99<--, 0.80<--    Urinalysis Basic - ( 2018 10:58 )    Color: YELLOW / Appearance: CLEAR / S.012 / pH: 6.0  Gluc: NEGATIVE / Ketone: TRACE  / Bili: NEGATIVE / Urobili: NORMAL   Blood: NEGATIVE / Protein: 200 / Nitrite: NEGATIVE   Leuk Esterase: NEGATIVE / RBC: 3-5 / WBC 3-5   Sq Epi: FEW / Non Sq Epi: x / Bacteria: NEGATIVE            MICROBIOLOGY:     RADIOLOGY:  [ ] Reviewed and interpreted by me    EKG: no

## 2019-12-08 RX ADMIN — FAMOTIDINE 20 MILLIGRAM(S): 10 INJECTION INTRAVENOUS at 05:23

## 2019-12-08 RX ADMIN — GABAPENTIN 300 MILLIGRAM(S): 400 CAPSULE ORAL at 05:23

## 2019-12-08 RX ADMIN — GABAPENTIN 300 MILLIGRAM(S): 400 CAPSULE ORAL at 21:03

## 2019-12-08 RX ADMIN — ENOXAPARIN SODIUM 40 MILLIGRAM(S): 100 INJECTION SUBCUTANEOUS at 11:06

## 2019-12-08 RX ADMIN — Medication 650 MILLIGRAM(S): at 11:30

## 2019-12-08 RX ADMIN — Medication 600 MILLIGRAM(S): at 14:05

## 2019-12-08 RX ADMIN — Medication 600 MILLIGRAM(S): at 08:10

## 2019-12-08 RX ADMIN — Medication 650 MILLIGRAM(S): at 17:33

## 2019-12-08 RX ADMIN — Medication 650 MILLIGRAM(S): at 05:23

## 2019-12-08 RX ADMIN — Medication 650 MILLIGRAM(S): at 11:07

## 2019-12-08 RX ADMIN — Medication 600 MILLIGRAM(S): at 02:02

## 2019-12-08 RX ADMIN — Medication 600 MILLIGRAM(S): at 13:45

## 2019-12-08 RX ADMIN — Medication 650 MILLIGRAM(S): at 23:39

## 2019-12-08 RX ADMIN — GABAPENTIN 300 MILLIGRAM(S): 400 CAPSULE ORAL at 13:45

## 2019-12-08 RX ADMIN — Medication 600 MILLIGRAM(S): at 07:44

## 2019-12-08 RX ADMIN — Medication 600 MILLIGRAM(S): at 20:46

## 2019-12-08 RX ADMIN — FAMOTIDINE 20 MILLIGRAM(S): 10 INJECTION INTRAVENOUS at 17:33

## 2019-12-08 RX ADMIN — OXYCODONE HYDROCHLORIDE 5 MILLIGRAM(S): 5 TABLET ORAL at 21:03

## 2019-12-08 NOTE — PROGRESS NOTE ADULT - ASSESSMENT
A/P: 43yo P0 S/P THOMAS/BS for menorrhagia, fibroid uterus, EBL 600cc, POD2, recovering well   - encourage ambulation, PO hydration  - advance to regular diet  - monitor vitals, bleeding   - encourage incentive spirometry   - pain mgmt with PO pain meds   - simethicone/senna  - routine postop care   - lovenox   - anticipate d/c home today or tomorrow pending discussion with PMD     Will inform Dr. Arango

## 2019-12-08 NOTE — PROGRESS NOTE ADULT - ATTENDING COMMENTS
as above, pt seen at bedside, agree with findings, impression and plan    IMP: s/p JANAYSO - POD # 2 - Doing Well    Plan: Continued PO Care, Ambulation, Pain management, Anticipated dc - 12/9

## 2019-12-09 ENCOUNTER — TRANSCRIPTION ENCOUNTER (OUTPATIENT)
Age: 44
End: 2019-12-09

## 2019-12-09 VITALS
RESPIRATION RATE: 18 BRPM | SYSTOLIC BLOOD PRESSURE: 122 MMHG | HEART RATE: 75 BPM | TEMPERATURE: 98 F | DIASTOLIC BLOOD PRESSURE: 71 MMHG

## 2019-12-09 RX ORDER — ACETAMINOPHEN 500 MG
2 TABLET ORAL
Qty: 0 | Refills: 0 | DISCHARGE
Start: 2019-12-09

## 2019-12-09 RX ORDER — OXYCODONE HYDROCHLORIDE 5 MG/1
1 TABLET ORAL
Qty: 8 | Refills: 0
Start: 2019-12-09 | End: 2019-12-10

## 2019-12-09 RX ORDER — IBUPROFEN 200 MG
1 TABLET ORAL
Qty: 0 | Refills: 0 | DISCHARGE

## 2019-12-09 RX ADMIN — Medication 600 MILLIGRAM(S): at 07:56

## 2019-12-09 RX ADMIN — Medication 600 MILLIGRAM(S): at 01:52

## 2019-12-09 RX ADMIN — GABAPENTIN 300 MILLIGRAM(S): 400 CAPSULE ORAL at 05:14

## 2019-12-09 RX ADMIN — Medication 650 MILLIGRAM(S): at 05:14

## 2019-12-09 RX ADMIN — FAMOTIDINE 20 MILLIGRAM(S): 10 INJECTION INTRAVENOUS at 05:14

## 2019-12-09 NOTE — PROGRESS NOTE ADULT - ATTENDING COMMENTS
IMP: s/p THOMAS, B/L Salpingectomy - POD # 3    Plan: dc home, NSAID's/Percocet, f/u office - 1 wk - wound check

## 2019-12-09 NOTE — DISCHARGE NOTE PROVIDER - NSDCCPCAREPLAN_GEN_ALL_CORE_FT
PRINCIPAL DISCHARGE DIAGNOSIS  Diagnosis: Menorrhagia  Assessment and Plan of Treatment: s/p hysterectomy, f/u in 1 week for postoperative check in the office

## 2019-12-09 NOTE — PROGRESS NOTE ADULT - ASSESSMENT
45 yo P0, menorrhagia, fibroid uterus, s/p THOMAS/BS,  cc, POD 3 recovering well  dc home  scripts sent to pharmacy  f/u in1 week for wound check  precautions given    Dr. roque aware

## 2019-12-09 NOTE — DISCHARGE NOTE NURSING/CASE MANAGEMENT/SOCIAL WORK - PATIENT PORTAL LINK FT
You can access the FollowMyHealth Patient Portal offered by [facility] by registering at the following website: [email or web address]. By joining [facility]’s FollowMyHealth portal, you will also be able to view your health information using other applications (apps) compatible with our system.

## 2019-12-09 NOTE — DISCHARGE NOTE PROVIDER - HOSPITAL COURSE
s/p THOMAS/BS for fibroid uterus and menorrhagia    uncomplicated    discharged home POD 3    f/u in 1 week

## 2019-12-09 NOTE — PROGRESS NOTE ADULT - SUBJECTIVE AND OBJECTIVE BOX
PGY 4 note    Patient seen at bedside and evaluated.  No complaints.  no fever/chill, nausea/vomiting, diarrhea, dysuria, vaginal bleeding, chest pain ,SOB, palpitations.  +flatus  Abd pain well controlled.  Voiding.  Ambulating.    ICU Vital Signs Last 24 Hrs  T(C): 36.7 (09 Dec 2019 05:06), Max: 37 (08 Dec 2019 16:54)  T(F): 98.1 (09 Dec 2019 05:06), Max: 98.6 (08 Dec 2019 16:54)  HR: 75 (09 Dec 2019 05:06) (75 - 94)  BP: 122/71 (09 Dec 2019 05:06) (115/62 - 126/74)  BP(mean): --  ABP: --  ABP(mean): --  RR: 18 (09 Dec 2019 05:06) (18 - 18)  SpO2: --    GEN: NAD, AAOx 3  Heart: RRR  Lungs; CTAB  Abd; soft, nontender, nondistended, no r/g/r, +BS, incision c/d/i  SVE: deferred  Ext: no calf tenderness or edema bl    labs      LABS:                        9.3    13.63 )-----------( 271      ( 07 Dec 2019 06:58 )             30.1       12-07-19 @ 06:58      138  |  105  |  14  ----------------------------<  126<H>  4.4   |  21  |  0.8        Ca    7.7<L>      07 Dec 2019 06:58  Phos  3.9     12-07  Mg     1.6<L>     12-07        medications    acetaminophen   Tablet ..   650 milliGRAM(s) Oral (12-08-19 @ 11:07)   650 milliGRAM(s) Oral (12-08-19 @ 17:33)   650 milliGRAM(s) Oral (12-08-19 @ 23:39)   650 milliGRAM(s) Oral (12-09-19 @ 05:14)    enoxaparin Injectable   40 milliGRAM(s) SubCutaneous (12-08-19 @ 11:06)    famotidine    Tablet   20 milliGRAM(s) Oral (12-08-19 @ 17:33)   20 milliGRAM(s) Oral (12-09-19 @ 05:14)    gabapentin   300 milliGRAM(s) Oral (12-08-19 @ 13:45)   300 milliGRAM(s) Oral (12-08-19 @ 21:03)   300 milliGRAM(s) Oral (12-09-19 @ 05:14)    ibuprofen  Tablet.   600 milliGRAM(s) Oral (12-08-19 @ 07:44)   600 milliGRAM(s) Oral (12-08-19 @ 13:45)   600 milliGRAM(s) Oral (12-08-19 @ 20:46)   600 milliGRAM(s) Oral (12-09-19 @ 01:52)    oxyCODONE    IR   5 milliGRAM(s) Oral (12-08-19 @ 21:03)

## 2019-12-09 NOTE — DISCHARGE NOTE PROVIDER - NSDCMRMEDTOKEN_GEN_ALL_CORE_FT
Advil 200 mg oral tablet: 1 tab(s) orally every 6 hours acetaminophen 325 mg oral tablet: 2 tab(s) orally every 6 hours, As Needed  Advil 200 mg oral tablet: 1 tab(s) orally every 6 hours, As Needed  oxyCODONE 5 mg oral capsule: 1 cap(s) orally every 6 hours MDD:4 tablets

## 2019-12-09 NOTE — DISCHARGE NOTE PROVIDER - NSDCFUADDINST_GEN_ALL_CORE_FT
Nothing in the vagina for 6 weeks. No tampons, no douching, no sex. No swimming, no tub-baths. May shower.  If fever 100.4F or greater, excessive vaginal bleeding, or severe abdominal pain, call your Ob/Gyn or come to ED or call 911.  NO heavy lifting

## 2019-12-12 LAB — SURGICAL PATHOLOGY STUDY: SIGNIFICANT CHANGE UP

## 2019-12-22 DIAGNOSIS — N92.0 EXCESSIVE AND FREQUENT MENSTRUATION WITH REGULAR CYCLE: ICD-10-CM

## 2019-12-22 DIAGNOSIS — D25.2 SUBSEROSAL LEIOMYOMA OF UTERUS: ICD-10-CM

## 2019-12-22 DIAGNOSIS — Z88.2 ALLERGY STATUS TO SULFONAMIDES: ICD-10-CM

## 2020-02-06 NOTE — ED PROVIDER NOTE - CARE PLAN
[FreeTextEntry3] : Miladys Lutz M.D.\par Director of Urology\par Director of Male Infertility\par [facility]\par [address] [place] [address], [facility] [address]\[place] [address]\par 
Principal Discharge DX:	Ankle sprain

## 2020-08-12 ENCOUNTER — TRANSCRIPTION ENCOUNTER (OUTPATIENT)
Age: 45
End: 2020-08-12

## 2020-09-04 NOTE — PROGRESS NOTE ADULT - SUBJECTIVE AND OBJECTIVE BOX
KATHIA THACKER  44y  Female    PGY3 Note:    Pt recovering well, no acute complaints. Denies severe abdominal pain, vaginal bleeding/ foul smelling vaginal discharge. Denies fever, chills, nausea/vomiting, diarrhea, dysuria, urgency, frequency.     Ambulating: Yes  Voiding: Yes  Flatus: Yes  Bowel movements: No   Diet: Full liquids     MEDICATIONS  (STANDING):  acetaminophen   Tablet .. 650 milliGRAM(s) Oral every 6 hours  enoxaparin Injectable 40 milliGRAM(s) SubCutaneous every 24 hours  famotidine    Tablet 20 milliGRAM(s) Oral two times a day  gabapentin 300 milliGRAM(s) Oral three times a day  ibuprofen  Tablet. 600 milliGRAM(s) Oral every 6 hours    MEDICATIONS  (PRN):  metoclopramide Injectable 10 milliGRAM(s) IV Push two times a day PRN nausea and vomiting  naloxone Injectable 0.1 milliGRAM(s) IV Push every 3 minutes PRN For ANY of the following changes in patient status:  A. RR LESS THAN 10 breaths per minute, B. Oxygen saturation LESS THAN 90%, C. Sedation score of 6  ondansetron Injectable 4 milliGRAM(s) IV Push every 6 hours PRN Nausea  ondansetron Injectable 8 milliGRAM(s) IV Push every 6 hours PRN Nausea and/or Vomiting  oxyCODONE    IR 5 milliGRAM(s) Oral every 4 hours PRN Severe Pain (7 - 10)      PAST MEDICAL & SURGICAL HISTORY:  Psoriasis  Anemia  History of dilatation and curettage  Induced termination of pregnancy: x2      Physical Exam  Vital Signs Last 24 Hrs  T(C): 36.4 (08 Dec 2019 05:16), Max: 37.1 (07 Dec 2019 22:00)  T(F): 97.6 (08 Dec 2019 05:16), Max: 98.8 (07 Dec 2019 22:00)  HR: 79 (08 Dec 2019 05:16) (67 - 97)  BP: 108/64 (08 Dec 2019 05:16) (103/57 - 115/56)  RR: 18 (08 Dec 2019 05:16) (18 - 18)    Gen: AAOx3, NAD  Heart: RRR, S1S2+  Lungs: CTA B/L, no r/r/w   Wound: steris in place c/d/i   Abd: Soft, nontender, nondistended, BS+  Perineum: no active bleeding   Ext: SCDs in place; no calf tenderness, no swelling    Labs:                        9.3    13.63 )-----------( 271      ( 07 Dec 2019 06:58 )             30.1 DISPLAY PLAN FREE TEXT

## 2020-11-16 NOTE — H&P PST ADULT - SYMPTOMS
none Mart-1 - Negative Histology Text: MART-1 staining demonstrates a normal density and pattern of melanocytes along the dermal-epidermal junction. The surgical margins are negative for tumor cells.

## 2021-01-04 ENCOUNTER — TRANSCRIPTION ENCOUNTER (OUTPATIENT)
Age: 46
End: 2021-01-04

## 2021-10-28 NOTE — PATIENT PROFILE ADULT - NSTRANSFEREYEGLASSESPAIRS_GEN_A_NUR
The patient is Watcher - Medium risk of patient condition declining or worsening    Shift Goals  Clinical Goals: pain management  Patient Goals: sleep and pain control  Family Goals: No family present    Progress made toward(s) clinical / shift goals:    Problem: Knowledge Deficit - Standard  Goal: Patient and family/care givers will demonstrate understanding of plan of care, disease process/condition, diagnostic tests and medications  Outcome: Progressing     Problem: Pain - Standard  Goal: Alleviation of pain or a reduction in pain to the patient’s comfort goal  Outcome: Progressing     Problem: Fall Risk  Goal: Patient will remain free from falls  Outcome: Progressing       Patient is not progressing towards the following goals:       1 pair

## 2022-06-17 NOTE — PATIENT PROFILE ADULT - BRADEN SENSORY
Monitor Summary: SR/ST , PA 0.16, QRS 0.06, QT 0.29 per strip from monitor room.             (4) no impairment

## 2022-08-15 ENCOUNTER — OUTPATIENT (OUTPATIENT)
Dept: OUTPATIENT SERVICES | Facility: HOSPITAL | Age: 47
LOS: 1 days | Discharge: HOME | End: 2022-08-15

## 2022-08-15 DIAGNOSIS — Z98.890 OTHER SPECIFIED POSTPROCEDURAL STATES: Chronic | ICD-10-CM

## 2022-08-15 DIAGNOSIS — Z33.2 ENCOUNTER FOR ELECTIVE TERMINATION OF PREGNANCY: Chronic | ICD-10-CM

## 2022-08-15 DIAGNOSIS — Z12.31 ENCOUNTER FOR SCREENING MAMMOGRAM FOR MALIGNANT NEOPLASM OF BREAST: ICD-10-CM

## 2022-08-15 PROCEDURE — 77067 SCR MAMMO BI INCL CAD: CPT | Mod: 26

## 2022-08-15 PROCEDURE — 77063 BREAST TOMOSYNTHESIS BI: CPT | Mod: 26

## 2022-08-21 NOTE — PRE-ANESTHESIA EVALUATION ADULT - NSANTHDIETYNSD_GEN_ALL_CORE
Called pt to ask if it was ok to speak to daughter Niru about her xray results. Pt states that it is ok to speak to her.   Yes

## 2023-04-02 ENCOUNTER — NON-APPOINTMENT (OUTPATIENT)
Age: 48
End: 2023-04-02

## 2023-08-17 ENCOUNTER — OUTPATIENT (OUTPATIENT)
Dept: OUTPATIENT SERVICES | Facility: HOSPITAL | Age: 48
LOS: 1 days | End: 2023-08-17
Payer: COMMERCIAL

## 2023-08-17 DIAGNOSIS — Z98.890 OTHER SPECIFIED POSTPROCEDURAL STATES: Chronic | ICD-10-CM

## 2023-08-17 DIAGNOSIS — Z12.31 ENCOUNTER FOR SCREENING MAMMOGRAM FOR MALIGNANT NEOPLASM OF BREAST: ICD-10-CM

## 2023-08-17 DIAGNOSIS — Z33.2 ENCOUNTER FOR ELECTIVE TERMINATION OF PREGNANCY: Chronic | ICD-10-CM

## 2023-08-17 PROCEDURE — 77067 SCR MAMMO BI INCL CAD: CPT | Mod: 26

## 2023-08-17 PROCEDURE — 77063 BREAST TOMOSYNTHESIS BI: CPT

## 2023-08-17 PROCEDURE — 77067 SCR MAMMO BI INCL CAD: CPT

## 2023-08-17 PROCEDURE — 77063 BREAST TOMOSYNTHESIS BI: CPT | Mod: 26

## 2023-08-18 DIAGNOSIS — Z12.31 ENCOUNTER FOR SCREENING MAMMOGRAM FOR MALIGNANT NEOPLASM OF BREAST: ICD-10-CM

## 2024-11-15 NOTE — DISCHARGE NOTE PROVIDER - CARE PROVIDER_API CALL
Patient just refilled and has enough to last until her appt 11/21/24.   Miguel Arango)  Obstetrics and Gynecology  [address] [place] [address]  Phone: [phone]  Fax: [phone]  Established Patient  Follow Up Time: 1 week

## 2024-11-22 NOTE — PATIENT PROFILE ADULT - LAST ORAL INTAKE
Care Transitions Note    Initial Call - Call within 2 business days of discharge: Yes    Attempted to reach patient for transitions of care follow up. Unable to reach patient.    Outreach Attempts:   2ND CTC attempt to reach Pt regarding recent hospital discharge.  CTC unable to leave voice recording with call back number requesting a call back, mailbox not set up.  CTN will close out CTN episode at this time.    Patient: Eugenia Langston    Patient : 1953   MRN: [id number]     Reason for Admission: Chest pain, CHF, CAD   Discharge Date: 24    RURS: Readmission Risk Score: 9    Last Discharge Facility       Date Complaint Diagnosis Description Type Department Provider    24 Shortness of Breath; Chest Pain Chest pain, unspecified type ... ED to Hosp-Admission (Discharged) (ADMITTED) [facility] Radha Pablo MD; Deejay Landa...            Was this an external facility discharge? No    Follow Up Appointment:   Patient does not have a follow up appointment scheduled at time of call.  Unable to reach patient, will forward message to PCP pool  Future Appointments         Provider Specialty Dept Phone    2024 3:00 PM Daniela Vega APRN - CNP Cardiology [phone]    2024 7:30 AM Jazmyne Frye MD Family Medicine [phone]            No further follow-up call indicated     Thank You,    Diana Paz RN  Care Transition Coordinator  Contact Number:[phone]          
07-Dec-2019

## 2025-05-13 NOTE — PATIENT PROFILE ADULT - BRADEN MOBILITY
Please cancel her 4:40 p.m. virtual visit   Please contact her about coming in to get a Tdap injection         (3) slightly limited